# Patient Record
(demographics unavailable — no encounter records)

---

## 2024-12-18 NOTE — DISCUSSION/SUMMARY
[de-identified] : Discussed findings of today's exam and possible causes of patient's pain.  Educated patient on their most probable diagnosis of right trochanteric bursitis/tendinitis.  Reviewed possible courses of treatment, and we collaboratively decided best course of treatment at this time will include conservative management.  Patient has been having 3+ weeks of lateral hip pain, she has already tried oral NSAIDs without any significant pain relief.  We discussed various treatment options as well as associated risk/benefits/alternatives and patient elected to proceed with cortisone injection today (see procedure note).  Informed the patient that the numbing medicine in today's injection will last for about 4-6 hours. The steroid that was injected will start to work in 1 to 2 days, peak at 1-2 weeks, and may last up to 1-2 months.  Patient will be started on a course of physical therapy to restore normal range of motion and strength as tolerated.  We discussed appropriate activity modification regarding her exercise regimen for the next 2-3 weeks.  Follow up as needed.  Patient appreciates and agrees with current plan.  This note was generated using dragon medical dictation software.  A reasonable effort has been made for proofreading its contents, but typos may still remain.  If there are any questions or points of clarification needed please notify my office.

## 2024-12-18 NOTE — HISTORY OF PRESENT ILLNESS
[de-identified] : 47 yo F presenting for evaluation of right hip pain x3 weeks which caused a lot of anterior hip pain initial injury occurred when she was deadlifting and felt her hip give out she has been able to ambulate but feels she is walking in a more strained, uncomfortable position compared to her regular gait able to move, lift her leg, but reports mild decrease in ROM she denies any trauma to the area has been taking ibuprofen 600mg BID x3 wks but does not think it has provided significant relief

## 2024-12-18 NOTE — PHYSICAL EXAM
[de-identified] : Constitutional: Well-nourished, well-developed, No acute distress Respiratory:  Good respiratory effort, no SOB Psychiatric: Pleasant and normal affect, alert and oriented x3 Musculoskeletal: normal except where as noted in regional exam  Right Hip:  APPEARANCE: no marked deformities, no swelling or malalignment POSITIVE TENDERNESS: Greater trochanter, and mild distally along ITB NONTENDER: TFL, gluteal region, ischium/proximal hamstring region, hip flexor region, sartorius and pubic symphysis.  ROM: full & painless.  RESISTIVE TESTING: Mild pain in lateral hip with resisted abduction, painless resisted ER/IR/SLR/adduction.  SPECIAL TESTS: neg KELLEN/FADIR, painless loaded flexion & scouring

## 2024-12-18 NOTE — PROCEDURE
[de-identified] : Injection: Right  Hip. Indication: Trochanteric Bursitis.  A discussion was had with the patient regarding this procedure and all questions were answered. All risks, benefits and alternatives were discussed. These included but were not limited to bleeding, infection, and allergic reaction. A timeout was done to ensure correct side and patient agreed to the procedure.  A Eek shahab was created on the skin utilizing a plastic needle cap to shahab the anticipated point of entry. Alcohol was used to clean the skin, and Betadine was used to sterilize and prep the area in the lateral aspect of the hip. A timeout was done to ensure correct side and pt agreed to the procedure.  Ethyl chloride spray was then used as a topical anesthetic. A 25-gauge needle was used to inject 2cc of 0.25% bupivacaine and 1cc of 40mg/ml methylprednisolone into the greater trochanteric bursa using a needling technique. A sterile bandage was then applied. The patient tolerated the procedure well and there were no complications